# Patient Record
Sex: MALE | HISPANIC OR LATINO | Employment: UNEMPLOYED | URBAN - METROPOLITAN AREA
[De-identification: names, ages, dates, MRNs, and addresses within clinical notes are randomized per-mention and may not be internally consistent; named-entity substitution may affect disease eponyms.]

---

## 2022-08-31 ENCOUNTER — OFFICE VISIT (OUTPATIENT)
Dept: FAMILY MEDICINE CLINIC | Facility: CLINIC | Age: 27
End: 2022-08-31

## 2022-08-31 VITALS
WEIGHT: 270.6 LBS | HEART RATE: 88 BPM | SYSTOLIC BLOOD PRESSURE: 118 MMHG | TEMPERATURE: 98 F | DIASTOLIC BLOOD PRESSURE: 78 MMHG | HEIGHT: 73 IN | BODY MASS INDEX: 35.86 KG/M2 | OXYGEN SATURATION: 100 %

## 2022-08-31 DIAGNOSIS — F90.2 ATTENTION DEFICIT HYPERACTIVITY DISORDER (ADHD), COMBINED TYPE: ICD-10-CM

## 2022-08-31 DIAGNOSIS — K59.00 CONSTIPATION, UNSPECIFIED CONSTIPATION TYPE: ICD-10-CM

## 2022-08-31 DIAGNOSIS — G43.709 CHRONIC MIGRAINE WITHOUT AURA WITHOUT STATUS MIGRAINOSUS, NOT INTRACTABLE: ICD-10-CM

## 2022-08-31 DIAGNOSIS — Z00.00 PREVENTATIVE HEALTH CARE: Primary | ICD-10-CM

## 2022-08-31 PROCEDURE — 86803 HEPATITIS C AB TEST: CPT | Performed by: NURSE PRACTITIONER

## 2022-08-31 PROCEDURE — 36415 COLL VENOUS BLD VENIPUNCTURE: CPT | Performed by: NURSE PRACTITIONER

## 2022-08-31 PROCEDURE — 83036 HEMOGLOBIN GLYCOSYLATED A1C: CPT | Performed by: NURSE PRACTITIONER

## 2022-08-31 PROCEDURE — 82607 VITAMIN B-12: CPT | Performed by: NURSE PRACTITIONER

## 2022-08-31 PROCEDURE — 82306 VITAMIN D 25 HYDROXY: CPT | Performed by: NURSE PRACTITIONER

## 2022-08-31 PROCEDURE — 80050 GENERAL HEALTH PANEL: CPT | Performed by: NURSE PRACTITIONER

## 2022-08-31 PROCEDURE — 2014F MENTAL STATUS ASSESS: CPT | Performed by: NURSE PRACTITIONER

## 2022-08-31 PROCEDURE — 99385 PREV VISIT NEW AGE 18-39: CPT | Performed by: NURSE PRACTITIONER

## 2022-08-31 PROCEDURE — 80061 LIPID PANEL: CPT | Performed by: NURSE PRACTITIONER

## 2022-08-31 PROCEDURE — 3008F BODY MASS INDEX DOCD: CPT | Performed by: NURSE PRACTITIONER

## 2022-09-01 LAB
25(OH)D3 SERPL-MCNC: 22.9 NG/ML (ref 30–100)
ALBUMIN SERPL-MCNC: 5.1 G/DL (ref 3.5–5.2)
ALBUMIN/GLOB SERPL: 2.3 G/DL
ALP SERPL-CCNC: 51 U/L (ref 39–117)
ALT SERPL W P-5'-P-CCNC: 50 U/L (ref 1–41)
ANION GAP SERPL CALCULATED.3IONS-SCNC: 9 MMOL/L (ref 5–15)
AST SERPL-CCNC: 28 U/L (ref 1–40)
BILIRUB SERPL-MCNC: 0.9 MG/DL (ref 0–1.2)
BUN SERPL-MCNC: 17 MG/DL (ref 6–20)
BUN/CREAT SERPL: 18.3 (ref 7–25)
CALCIUM SPEC-SCNC: 10.1 MG/DL (ref 8.6–10.5)
CHLORIDE SERPL-SCNC: 101 MMOL/L (ref 98–107)
CHOLEST SERPL-MCNC: 208 MG/DL (ref 0–200)
CO2 SERPL-SCNC: 27 MMOL/L (ref 22–29)
CREAT SERPL-MCNC: 0.93 MG/DL (ref 0.76–1.27)
DEPRECATED RDW RBC AUTO: 41.6 FL (ref 37–54)
EGFRCR SERPLBLD CKD-EPI 2021: 115.4 ML/MIN/1.73
ERYTHROCYTE [DISTWIDTH] IN BLOOD BY AUTOMATED COUNT: 13.7 % (ref 12.3–15.4)
GLOBULIN UR ELPH-MCNC: 2.2 GM/DL
GLUCOSE SERPL-MCNC: 87 MG/DL (ref 65–99)
HBA1C MFR BLD: 5.3 % (ref 3.5–5.6)
HCT VFR BLD AUTO: 51 % (ref 37.5–51)
HCV AB SER DONR QL: NORMAL
HDLC SERPL-MCNC: 44 MG/DL (ref 40–60)
HGB BLD-MCNC: 16 G/DL (ref 13–17.7)
LDLC SERPL CALC-MCNC: 147 MG/DL (ref 0–100)
LDLC/HDLC SERPL: 3.3 {RATIO}
MCH RBC QN AUTO: 26.6 PG (ref 26.6–33)
MCHC RBC AUTO-ENTMCNC: 31.4 G/DL (ref 31.5–35.7)
MCV RBC AUTO: 84.7 FL (ref 79–97)
PLATELET # BLD AUTO: 282 10*3/MM3 (ref 140–450)
PMV BLD AUTO: 12 FL (ref 6–12)
POTASSIUM SERPL-SCNC: 4.4 MMOL/L (ref 3.5–5.2)
PROT SERPL-MCNC: 7.3 G/DL (ref 6–8.5)
RBC # BLD AUTO: 6.02 10*6/MM3 (ref 4.14–5.8)
SODIUM SERPL-SCNC: 137 MMOL/L (ref 136–145)
TRIGL SERPL-MCNC: 93 MG/DL (ref 0–150)
TSH SERPL DL<=0.05 MIU/L-ACNC: 1.45 UIU/ML (ref 0.27–4.2)
VIT B12 BLD-MCNC: 705 PG/ML (ref 211–946)
VLDLC SERPL-MCNC: 17 MG/DL (ref 5–40)
WBC NRBC COR # BLD: 9.78 10*3/MM3 (ref 3.4–10.8)

## 2022-09-02 DIAGNOSIS — E55.9 VITAMIN D DEFICIENCY: Primary | ICD-10-CM

## 2022-09-02 RX ORDER — ERGOCALCIFEROL 1.25 MG/1
CAPSULE ORAL
Qty: 15 CAPSULE | Refills: 1 | Status: SHIPPED | OUTPATIENT
Start: 2022-09-02 | End: 2022-09-09 | Stop reason: SDUPTHER

## 2022-09-08 DIAGNOSIS — F90.2 ATTENTION DEFICIT HYPERACTIVITY DISORDER (ADHD), COMBINED TYPE: Primary | ICD-10-CM

## 2022-09-09 ENCOUNTER — TELEPHONE (OUTPATIENT)
Dept: FAMILY MEDICINE CLINIC | Facility: CLINIC | Age: 27
End: 2022-09-09

## 2022-09-09 DIAGNOSIS — F90.2 ATTENTION DEFICIT HYPERACTIVITY DISORDER (ADHD), COMBINED TYPE: ICD-10-CM

## 2022-09-09 RX ORDER — ERGOCALCIFEROL 1.25 MG/1
CAPSULE ORAL
Qty: 15 CAPSULE | Refills: 1 | Status: SHIPPED | OUTPATIENT
Start: 2022-09-09 | End: 2023-03-20 | Stop reason: SDUPTHER

## 2022-09-09 NOTE — TELEPHONE ENCOUNTER
Patient called because it will cost him $500 if he does not get this from a pharmacy in network with his insurance. I have recent his vitamin D to the new pharmacy but I have the Vyvanse pending if you could sign for him. I have called and cancelled old Rx to [facility].

## 2022-09-09 NOTE — TELEPHONE ENCOUNTER
PATIENT IS CALLING TO CHECK THE STATUS OF SWITCHING THE FOLLOWING MEDICATIONS TO A DIFFERENT PHARMACY THAT PARTICIPATES WITH HIS INSURANCE.  THE PRESCRIPTIONS WERE CALLED IN TO [facility] BUT HE NEEDS THE MEDICATIONS TO GO TO THE FOLLOWING [facility].    [id number] - [facility] [place] - [address] - [phone]  - [phone] FX  [phone]      vitamin D (ERGOCALCIFEROL) 1.25 MG (21281 UT) capsule capsule                     lisdexamfetamine (Vyvanse) 50 MG capsule     PATIENT IS REQUESTING A RETURN CALL TO ADVISE WHEN THE MEDICATIONS HAVE BEEN SENT TO [facility].    PATIENT CALL BACK [phone]

## 2022-09-09 NOTE — TELEPHONE ENCOUNTER
Caller: Jack Pradhan    Relationship: Self    Best call back number: [phone]    What is the best time to reach you: ANY TIME    Who are you requesting to speak with (clinical staff, provider,  specific staff member): CLINICAL STAFF    Do you know the name of the person who called:     What was the call regarding:  PATIENT REQUESTS HIS VYVANSE AND B12 BOTH BE SENT TO [facility] IN [place]. [facility] IS INSURANCE'S PREFERRED PHARMACY.     PLEASE CALL PATIENT WHEN SENT.     Do you require a callback: YES

## 2022-10-10 DIAGNOSIS — F90.2 ATTENTION DEFICIT HYPERACTIVITY DISORDER (ADHD), COMBINED TYPE: ICD-10-CM

## 2022-10-10 NOTE — TELEPHONE ENCOUNTER
Caller: Jack Pradhan    Relationship: Self    Best call back number:     Requested Prescriptions:   Requested Prescriptions     Pending Prescriptions Disp Refills   • lisdexamfetamine (Vyvanse) 50 MG capsule 30 capsule 0     Sig: Take 1 capsule by mouth Every Morning        Pharmacy where request should be sent: [facility]  [address] [place] [address] [phone]     Additional details provided by patient:     Does the patient have less than a 3 day supply:  [x] Yes  [] No    Lauren Cabrera Rep   10/10/22 14:19 EDT

## 2022-11-16 ENCOUNTER — TELEPHONE (OUTPATIENT)
Dept: FAMILY MEDICINE CLINIC | Facility: CLINIC | Age: 27
End: 2022-11-16

## 2022-11-16 DIAGNOSIS — F90.2 ATTENTION DEFICIT HYPERACTIVITY DISORDER (ADHD), COMBINED TYPE: ICD-10-CM

## 2022-11-16 NOTE — TELEPHONE ENCOUNTER
Caller: Jack Pradhan    Relationship: Self    Best call back number: [phone]    Requested Prescriptions:   Requested Prescriptions     Pending Prescriptions Disp Refills   • lisdexamfetamine (Vyvanse) 50 MG capsule 30 capsule 0     Sig: Take 1 capsule by mouth Every Morning        Pharmacy where request should be sent: [id number] - [phone]  - [phone] FX     Additional details provided by patient:     Does the patient have less than a 3 day supply:  [x] Yes  [] No    Lauren Pal Rep   11/16/22 13:36 EST

## 2022-12-19 ENCOUNTER — OFFICE VISIT (OUTPATIENT)
Dept: FAMILY MEDICINE CLINIC | Facility: CLINIC | Age: 27
End: 2022-12-19

## 2022-12-19 VITALS
DIASTOLIC BLOOD PRESSURE: 78 MMHG | WEIGHT: 259.8 LBS | SYSTOLIC BLOOD PRESSURE: 110 MMHG | HEIGHT: 73 IN | HEART RATE: 93 BPM | BODY MASS INDEX: 34.43 KG/M2 | OXYGEN SATURATION: 97 %

## 2022-12-19 DIAGNOSIS — F90.2 ATTENTION DEFICIT HYPERACTIVITY DISORDER (ADHD), COMBINED TYPE: Primary | ICD-10-CM

## 2022-12-19 DIAGNOSIS — H65.01 NON-RECURRENT ACUTE SEROUS OTITIS MEDIA OF RIGHT EAR: ICD-10-CM

## 2022-12-19 DIAGNOSIS — G43.709 CHRONIC MIGRAINE WITHOUT AURA WITHOUT STATUS MIGRAINOSUS, NOT INTRACTABLE: ICD-10-CM

## 2022-12-19 DIAGNOSIS — E55.9 VITAMIN D DEFICIENCY: ICD-10-CM

## 2022-12-19 PROCEDURE — 99214 OFFICE O/P EST MOD 30 MIN: CPT | Performed by: NURSE PRACTITIONER

## 2022-12-19 RX ORDER — AZITHROMYCIN 250 MG/1
TABLET, FILM COATED ORAL
Qty: 6 TABLET | Refills: 0 | Status: SHIPPED | OUTPATIENT
Start: 2022-12-19 | End: 2023-03-20

## 2022-12-19 RX ORDER — CHLORCYCLIZINE HYDROCHLORIDE AND PSEUDOEPHEDRINE HYDROCHLORIDE 25; 60 MG/1; MG/1
1 TABLET ORAL EVERY 8 HOURS PRN
Qty: 40 TABLET | Refills: 0 | Status: SHIPPED | OUTPATIENT
Start: 2022-12-19 | End: 2023-03-20

## 2023-01-17 ENCOUNTER — TELEPHONE (OUTPATIENT)
Dept: FAMILY MEDICINE CLINIC | Facility: CLINIC | Age: 28
End: 2023-01-17

## 2023-01-17 DIAGNOSIS — F90.2 ATTENTION DEFICIT HYPERACTIVITY DISORDER (ADHD), COMBINED TYPE: ICD-10-CM

## 2023-01-17 NOTE — TELEPHONE ENCOUNTER
Caller: Jack Pradhan    Relationship: Self    Best call back number: [phone]    What medication are you requesting: lisdexamfetamine (Vyvanse)          50 MG     What are your current symptoms:     How long have you been experiencing symptoms:     Have you had these symptoms before:      [] Yes  [] No    Have you been treated for these symptoms before:   [] Yes  [] No    If a prescription is needed, what is your preferred pharmacy and phone number: [id number] - [address] - [phone] [facility] [phone]      Additional notes: PATIENT STATES 60 MG DOES WORK WELL FOR HIM. ASKS THAT [facility] PRESCRIBE 50 MG

## 2023-01-18 RX ORDER — LISDEXAMFETAMINE DIMESYLATE CAPSULES 50 MG/1
50 CAPSULE ORAL EVERY MORNING
Qty: 30 CAPSULE | Refills: 0 | Status: SHIPPED | OUTPATIENT
Start: 2023-01-18 | End: 2023-02-22 | Stop reason: SDUPTHER

## 2023-01-18 NOTE — TELEPHONE ENCOUNTER
Spoke to pt and he is requesting the 50mg. Pt said the 60mg made him feel like he wasn't able to focus, he was feeling more sad than usual and his emotions were irrational. Pt also says the 50mg worked well in the beginning and then after a while he feels like it stopped working as well. Pt says he still prefers the 50mg over the 60mg

## 2023-02-22 DIAGNOSIS — F90.2 ATTENTION DEFICIT HYPERACTIVITY DISORDER (ADHD), COMBINED TYPE: ICD-10-CM

## 2023-02-22 NOTE — TELEPHONE ENCOUNTER
Caller: Jack Pradhan    Relationship: Self    Best call back number: [phone]    Requested Prescriptions:   Requested Prescriptions     Pending Prescriptions Disp Refills   • lisdexamfetamine (Vyvanse) 50 MG capsule 30 capsule 0     Sig: Take 1 capsule by mouth Every Morning        Pharmacy where request should be sent: [id number] - [phone]  - [phone] FX     Additional details provided by patient: PATIENT IS COMPLETELY OUT     Does the patient have less than a 3 day supply:  [x] Yes  [] No    Would you like a call back once the refill request has been completed: [x] Yes [] No    If the office needs to give you a call back, can they leave a voicemail: [x] Yes [] No    Lauren Pinon Rep   02/22/23 12:59 EST

## 2023-03-20 ENCOUNTER — OFFICE VISIT (OUTPATIENT)
Dept: FAMILY MEDICINE CLINIC | Facility: CLINIC | Age: 28
End: 2023-03-20
Payer: MEDICAID

## 2023-03-20 VITALS
WEIGHT: 252.2 LBS | HEIGHT: 73 IN | HEART RATE: 87 BPM | BODY MASS INDEX: 33.43 KG/M2 | SYSTOLIC BLOOD PRESSURE: 128 MMHG | DIASTOLIC BLOOD PRESSURE: 82 MMHG | OXYGEN SATURATION: 98 %

## 2023-03-20 DIAGNOSIS — F90.2 ATTENTION DEFICIT HYPERACTIVITY DISORDER (ADHD), COMBINED TYPE: ICD-10-CM

## 2023-03-20 DIAGNOSIS — E55.9 VITAMIN D DEFICIENCY: ICD-10-CM

## 2023-03-20 PROCEDURE — 1159F MED LIST DOCD IN RCRD: CPT | Performed by: NURSE PRACTITIONER

## 2023-03-20 PROCEDURE — 99213 OFFICE O/P EST LOW 20 MIN: CPT | Performed by: NURSE PRACTITIONER

## 2023-03-20 PROCEDURE — 1160F RVW MEDS BY RX/DR IN RCRD: CPT | Performed by: NURSE PRACTITIONER

## 2023-03-20 RX ORDER — ERGOCALCIFEROL 1.25 MG/1
CAPSULE ORAL
Qty: 15 CAPSULE | Refills: 1 | Status: SHIPPED | OUTPATIENT
Start: 2023-03-20
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
## 2023-03-21 DIAGNOSIS — F90.2 ATTENTION DEFICIT HYPERACTIVITY DISORDER (ADHD), COMBINED TYPE: ICD-10-CM
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
## 2023-04-06 ENCOUNTER — OFFICE VISIT (OUTPATIENT)
Dept: FAMILY MEDICINE CLINIC | Facility: CLINIC | Age: 28
End: 2023-04-06
Payer: MEDICAID

## 2023-04-06 VITALS
WEIGHT: 246.6 LBS | OXYGEN SATURATION: 98 % | HEIGHT: 73 IN | SYSTOLIC BLOOD PRESSURE: 126 MMHG | HEART RATE: 95 BPM | BODY MASS INDEX: 32.68 KG/M2 | DIASTOLIC BLOOD PRESSURE: 82 MMHG

## 2023-04-06 DIAGNOSIS — Z20.9 SEXUAL BEHAVIOR WITH HIGH RISK OF EXPOSURE TO COMMUNICABLE DISEASE: ICD-10-CM

## 2023-04-06 DIAGNOSIS — Z72.51 SEXUAL BEHAVIOR WITH HIGH RISK OF EXPOSURE TO COMMUNICABLE DISEASE: ICD-10-CM

## 2023-04-06 DIAGNOSIS — M25.561 CHRONIC PAIN OF BOTH KNEES: ICD-10-CM

## 2023-04-06 DIAGNOSIS — F90.2 ATTENTION DEFICIT HYPERACTIVITY DISORDER (ADHD), COMBINED TYPE: Primary | ICD-10-CM

## 2023-04-06 DIAGNOSIS — G89.29 CHRONIC PAIN OF BOTH KNEES: ICD-10-CM

## 2023-04-06 DIAGNOSIS — M25.562 CHRONIC PAIN OF BOTH KNEES: ICD-10-CM

## 2023-04-06 LAB
C TRACH RRNA CVX QL NAA+PROBE: NOT DETECTED
N GONORRHOEA RRNA SPEC QL NAA+PROBE: NOT DETECTED

## 2023-04-06 PROCEDURE — 87661 TRICHOMONAS VAGINALIS AMPLIF: CPT | Performed by: NURSE PRACTITIONER

## 2023-04-06 PROCEDURE — 86696 HERPES SIMPLEX TYPE 2 TEST: CPT | Performed by: NURSE PRACTITIONER

## 2023-04-06 PROCEDURE — 86592 SYPHILIS TEST NON-TREP QUAL: CPT | Performed by: NURSE PRACTITIONER

## 2023-04-06 PROCEDURE — 87591 N.GONORRHOEAE DNA AMP PROB: CPT | Performed by: NURSE PRACTITIONER

## 2023-04-06 PROCEDURE — 87491 CHLMYD TRACH DNA AMP PROBE: CPT | Performed by: NURSE PRACTITIONER

## 2023-04-06 PROCEDURE — 86695 HERPES SIMPLEX TYPE 1 TEST: CPT | Performed by: NURSE PRACTITIONER

## 2023-04-06 PROCEDURE — G0432 EIA HIV-1/HIV-2 SCREEN: HCPCS | Performed by: NURSE PRACTITIONER

## 2023-04-06 RX ORDER — ACETAMINOPHEN 500 MG
500 TABLET ORAL EVERY 6 HOURS PRN
COMMUNITY

## 2023-04-06 RX ORDER — MELOXICAM 15 MG/1
15 TABLET ORAL DAILY
Qty: 90 TABLET | Refills: 0 | Status: SHIPPED | OUTPATIENT
Start: 2023-04-06

## 2023-04-06 NOTE — PROGRESS NOTES
Venipuncture Blood Specimen Collection  Venipuncture performed in right arm by Heather Abbasi MA with good hemostasis. Patient tolerated the procedure well without complications.   04/06/23   Heather Abbasi MA
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
## 2023-04-07 LAB
HIV1+2 AB SER QL: NORMAL
RPR SER QL: NORMAL

## 2023-04-08 LAB
HSV1 IGG SER IA-ACNC: <0.91 INDEX (ref 0–0.9)
HSV2 IGG SER IA-ACNC: <0.91 INDEX (ref 0–0.9)

## 2023-04-10 LAB — T VAGINALIS RRNA SPEC QL NAA+PROBE: NEGATIVE

## 2023-04-12 ENCOUNTER — TELEPHONE (OUTPATIENT)
Dept: FAMILY MEDICINE CLINIC | Facility: CLINIC | Age: 28
End: 2023-04-12
Payer: MEDICAID

## 2023-04-12 NOTE — TELEPHONE ENCOUNTER
Spoke with pt regarding xray, pt states still planning on completing. Pt needed address, sent [place] info via [employer]

## 2023-04-25 DIAGNOSIS — F90.2 ATTENTION DEFICIT HYPERACTIVITY DISORDER (ADHD), COMBINED TYPE: ICD-10-CM

## 2023-04-28 ENCOUNTER — TELEPHONE (OUTPATIENT)
Dept: FAMILY MEDICINE CLINIC | Facility: CLINIC | Age: 28
End: 2023-04-28

## 2023-04-28 NOTE — TELEPHONE ENCOUNTER
Spoke with Jack. He stated that he did review message in [employer]. He would like to proceed with MRI at this time.

## 2023-04-28 NOTE — TELEPHONE ENCOUNTER
Caller: Jack Pradhan    Relationship: Self    Best call back number: [phone]       PATIENT HAD A X-RAY ON HIS KNEES RECENTLY AND WOULD LIKE TO DISCUSS WHAT NEXT STEPS ARE.    PLEASE ADVISE

## 2023-05-01 DIAGNOSIS — M25.562 CHRONIC PAIN OF LEFT KNEE: Primary | ICD-10-CM

## 2023-05-01 DIAGNOSIS — G89.29 CHRONIC PAIN OF LEFT KNEE: Primary | ICD-10-CM

## 2023-05-08 ENCOUNTER — TELEPHONE (OUTPATIENT)
Dept: FAMILY MEDICINE CLINIC | Facility: CLINIC | Age: 28
End: 2023-05-08
Payer: MEDICAID

## 2023-05-08 NOTE — TELEPHONE ENCOUNTER
PA for MRI knee approved. Order and auth faxed to [facility]. Left detailed voice message for Jack informing him to call [place] at anytime to schedule or call back to office if prefer we schedule.    If he wants us to schedule, need any day/time preferences    Ok for HUB to read/reply.

## 2023-05-11 ENCOUNTER — TELEPHONE (OUTPATIENT)
Dept: FAMILY MEDICINE CLINIC | Facility: CLINIC | Age: 28
End: 2023-05-11
Payer: MEDICAID

## 2023-05-11 NOTE — TELEPHONE ENCOUNTER
Attempted to call pt regarding MRI, no answer: per verbal left msg with [place] number and to call office if scheduled or completed

## 2023-06-02 DIAGNOSIS — F90.2 ATTENTION DEFICIT HYPERACTIVITY DISORDER (ADHD), COMBINED TYPE: ICD-10-CM

## 2023-06-12 ENCOUNTER — OFFICE VISIT (OUTPATIENT)
Dept: FAMILY MEDICINE CLINIC | Facility: CLINIC | Age: 28
End: 2023-06-12
Payer: MEDICAID

## 2023-06-12 VITALS
HEART RATE: 92 BPM | BODY MASS INDEX: 32.89 KG/M2 | SYSTOLIC BLOOD PRESSURE: 128 MMHG | OXYGEN SATURATION: 98 % | HEIGHT: 73 IN | WEIGHT: 248.2 LBS | DIASTOLIC BLOOD PRESSURE: 88 MMHG

## 2023-06-12 DIAGNOSIS — M25.361 KNEE INSTABILITY, RIGHT: ICD-10-CM

## 2023-06-12 DIAGNOSIS — G89.29 CHRONIC MID BACK PAIN: Primary | ICD-10-CM

## 2023-06-12 DIAGNOSIS — M25.521 RIGHT ELBOW PAIN: ICD-10-CM

## 2023-06-12 DIAGNOSIS — M25.531 BILATERAL WRIST PAIN: ICD-10-CM

## 2023-06-12 DIAGNOSIS — M25.532 BILATERAL WRIST PAIN: ICD-10-CM

## 2023-06-12 DIAGNOSIS — F90.2 ATTENTION DEFICIT HYPERACTIVITY DISORDER (ADHD), COMBINED TYPE: ICD-10-CM

## 2023-06-12 DIAGNOSIS — M54.9 CHRONIC MID BACK PAIN: Primary | ICD-10-CM

## 2023-06-12 DIAGNOSIS — G89.29 CHRONIC PAIN OF BOTH KNEES: ICD-10-CM

## 2023-06-12 DIAGNOSIS — E55.9 VITAMIN D DEFICIENCY: ICD-10-CM

## 2023-06-12 DIAGNOSIS — M25.562 CHRONIC PAIN OF BOTH KNEES: ICD-10-CM

## 2023-06-12 DIAGNOSIS — M25.561 CHRONIC PAIN OF BOTH KNEES: ICD-10-CM

## 2023-06-12 PROCEDURE — 1160F RVW MEDS BY RX/DR IN RCRD: CPT | Performed by: NURSE PRACTITIONER

## 2023-06-12 PROCEDURE — 99214 OFFICE O/P EST MOD 30 MIN: CPT | Performed by: NURSE PRACTITIONER

## 2023-06-12 PROCEDURE — 1159F MED LIST DOCD IN RCRD: CPT | Performed by: NURSE PRACTITIONER

## 2023-06-12 RX ORDER — INDOMETHACIN 75 MG/1
75 CAPSULE, EXTENDED RELEASE ORAL 2 TIMES DAILY PRN
Qty: 60 CAPSULE | Refills: 0 | Status: SHIPPED | OUTPATIENT
Start: 2023-06-12

## 2023-06-12 NOTE — PROGRESS NOTES
"Chief Complaint  Chief Complaint   Patient presents with   • Follow-up     Follow up for ADD   • Joint Pain     Pt having joint pain in right elbow and both wrists. Pt says the pain is constant. Pt says pain in elbow started a few months ago and pain in wrist has been going on for several years.    • Medication Problem     Pt says current dose of Vyvanse is helping but pt would like to increase dose again   • Back Pain     Pt having pain in lower back, pain started a few weeks ago. Pt says the pain is at a \"constant 2\" on a scale of 1-10. 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 The patient is working, able to complete tasks and has good focus, denies chest pain, headache, shortness of air, palpitations and swelling of extremities, needs medication refill today.     Right elbow and bilateral wrist pain, constant, he drives for work, uses right hand mostly. He was in the  had multiple blunt force trauma injuries.  He was started on meloxicam for bilateral knee pain-reports minimal improvement, x-ray of both knees was essentially normal, patient wished to proceed with MRI, it was ordered on 5/1/2023 and scheduled for today.  He now reports the right knee is bothering him more than the left, it is unstable at times, he wears a brace for work.  He reports crepitus with any bending or squatting    Mid low back pain as mentioned in chief complaint, ache, after sitting too long, he drives for work. He fell off a 10 foot ledge at age 7, denies injury to the back at that time. He exercise regularly, stopped doing core exercises lately due to certain motions increasing pain      The " "following portions of the patient's history were reviewed and updated as appropriate: allergies, current medications, past family history, past medical history, past social history, past surgical history and problem list.    Past Medical History:   Diagnosis Date   • ADHD (attention deficit hyperactivity disorder)    • Eczema    • Headache      History reviewed. No pertinent surgical history.  History reviewed. No pertinent family history.  Social History     Tobacco Use   • Smoking status: Never   • Smokeless tobacco: Never   Substance Use Topics   • Alcohol use: Yes     Comment: OCC       Current Outpatient Medications:   •  acetaminophen (TYLENOL) 500 MG tablet, Take 1 tablet by mouth Every 6 (Six) Hours As Needed for Mild Pain., Disp: , Rfl:   •  meloxicam (Mobic) 15 MG tablet, Take 1 tablet by mouth Daily., Disp: 90 tablet, Rfl: 0  •  vitamin D (ERGOCALCIFEROL) 1.25 MG (62485 UT) capsule capsule, Take 1 po weekly on mondays, Disp: 15 capsule, Rfl: 1  •  indomethacin SR (INDOCIN SR) 75 MG CR capsule, Take 1 capsule by mouth 2 (Two) Times a Day As Needed (joint/back pain)., Disp: 60 capsule, Rfl: 0  •  lisdexamfetamine (Vyvanse) 70 MG capsule, Take 1 capsule by mouth Every Morning, Disp: 30 capsule, Rfl: 0    Objective   Vital Signs:   /88 (BP Location: Right arm, Patient Position: Sitting, Cuff Size: Adult)   Pulse 92   Ht 185.4 cm (72.99\")   Wt 113 kg (248 lb 3.2 oz)   SpO2 98%   BMI 32.75 kg/m²           Physical Exam  Constitutional:       General: He is not in acute distress.     Appearance: Normal appearance. He is well-developed. He is not ill-appearing or diaphoretic.   HENT:      Head: Normocephalic.   Eyes:      Conjunctiva/sclera: Conjunctivae normal.      Pupils: Pupils are equal, round, and reactive to light.   Neck:      Thyroid: No thyromegaly.      Vascular: No JVD.   Cardiovascular:      Rate and Rhythm: Normal rate and regular rhythm.      Heart sounds: Normal heart sounds. No murmur " heard.  Pulmonary:      Effort: Pulmonary effort is normal. No respiratory distress.      Breath sounds: Normal breath sounds. No wheezing or rhonchi.   Abdominal:      General: Bowel sounds are normal. There is no distension.      Palpations: Abdomen is soft.      Tenderness: There is no abdominal tenderness.   Musculoskeletal:         General: Tenderness (mid/low back, R elbow, B wrists, R>L knee ttp, redness present of both knees, good rom of all joints) present. No swelling. Normal range of motion.      Cervical back: Normal range of motion and neck supple. No tenderness.   Lymphadenopathy:      Cervical: No cervical adenopathy.   Skin:     General: Skin is warm and dry.      Coloration: Skin is not jaundiced.      Findings: No erythema or rash.   Neurological:      General: No focal deficit present.      Mental Status: He is alert and oriented to person, place, and time. Mental status is at baseline.      Sensory: No sensory deficit.   Psychiatric:         Mood and Affect: Mood normal.         Behavior: Behavior normal.         Thought Content: Thought content normal.         Judgment: Judgment normal.          Result Review :     No visits with results within 7 Day(s) from this visit.   Latest known visit with results is:   Office Visit on 04/06/2023   Component Date Value Ref Range Status   • HIV-1/ HIV-2 04/06/2023 Non-Reactive  Non-Reactive Final    A non-reactive test result does not preclude the possibility of exposure to HIV or infection with HIV. An antibody response to recent exposure may take several months to reach detectable levels.   • HSV 1 IgG, Type Specific 04/06/2023 <0.91  0.00 - 0.90 index Final                                     Negative        <0.91                                   Equivocal 0.91 - 1.09                                   Positive        >1.09   Note: Negative indicates no antibodies detected to   HSV-1. Equivocal may suggest early infection.  If   clinically appropriate,  retest at later date. Positive   indicates antibodies detected to HSV-1.   • HSV 2 IgG 04/06/2023 <0.91  0.00 - 0.90 index Final                                     Negative        <0.91                                   Equivocal 0.91 - 1.09                                   Positive        >1.09   Note: Negative indicates no HSV-2 antibodies detected.   Positive indicates HSV-2 antibodies detected.   Equivocal and low positive HSV-2 screens   (Index 0.91-5.00) may be false positive and are   reflexed to supplemental testing in accordance with   CDC guidelines.   • RPR 04/06/2023 Non-Reactive  Non-Reactive Final   • Trichomonas vaginosis 04/06/2023 Negative  Negative Final   • Chlamydia DNA by PCR 04/06/2023 Not Detected  Not Detected, Invalid Final   • Neisseria gonorrhoeae by PCR 04/06/2023 Not Detected  Not Detected Final                              Assessment and Plan    Diagnoses and all orders for this visit:    1. Chronic mid back pain (Primary)  Comments:  Recommend trial of indomethacin  DC meloxicam if indomethacin effective  rec core strengthening    2. Attention deficit hyperactivity disorder (ADHD), combined type  Comments:  will try increase of vyvanse to 70mg  Orders:  -     lisdexamfetamine (Vyvanse) 70 MG capsule; Take 1 capsule by mouth Every Morning  Dispense: 30 capsule; Refill: 0    3. Chronic pain of both knees  Comments:  R knee now worse, left knee still painful.   MRI left knee later today.   ordered R knee MRI  Orders:  -     MRI Knee Right Without Contrast; Future    4. Right elbow pain  Comments:  Recommend limiting of repetitive motions  pain of multi joints and low back likely secondary to trauma and overuse while in   rec trial of indomethacin    5. Bilateral wrist pain  Comments:  Likely due to repetitive motions while driving/working, try to limit use  try indomethacin    6. Vitamin D deficiency  Comments:  Continue weekly vitamin D    7. Knee instability,  right  Comments:  xray essentially normal  R knee MRI ordered  Continue to wear brace for work  Consider referral to Ortho  Orders:  -     MRI Knee Right Without Contrast; Future    Other orders  -     indomethacin SR (INDOCIN SR) 75 MG CR capsule; Take 1 capsule by mouth 2 (Two) Times a Day As Needed (joint/back pain).  Dispense: 60 capsule; Refill: 0      Will check uric acid with labs in august at annual exam    I spent 30 minutes caring for Jack Pradhan on this date of service. This time includes time spent by me in the following activities: preparing for the visit, reviewing tests, performing a medically appropriate examination and/or evaluation , counseling and educating the patient/family/caregiver, ordering medications, tests, or procedures and documenting information in the medical record        Follow Up     Return in about 3 months (around 9/12/2023) for annual exam, recheck ADD, multi joint pain. HTN panel and uric acid prior to appt.  Patient was given instructions and counseling regarding his condition or for health maintenance advice. Please see specific information pulled into the AVS if appropriate.        Part of this note may be an electronic transcription/translation of spoken language to printed text using the Dragon Dictation System

## 2023-06-14 DIAGNOSIS — M25.562 CHRONIC PAIN OF LEFT KNEE: ICD-10-CM

## 2023-06-14 DIAGNOSIS — M25.361 KNEE INSTABILITY, RIGHT: ICD-10-CM

## 2023-06-14 DIAGNOSIS — G89.29 CHRONIC PAIN OF LEFT KNEE: ICD-10-CM

## 2023-06-14 DIAGNOSIS — M71.22 BAKER'S CYST OF KNEE, LEFT: Primary | ICD-10-CM

## 2023-08-18 DIAGNOSIS — F90.2 ATTENTION DEFICIT HYPERACTIVITY DISORDER (ADHD), COMBINED TYPE: ICD-10-CM

## 2023-08-18 NOTE — TELEPHONE ENCOUNTER
Caller: Jack Pradhan    Relationship: Self    Best call back number: [phone]   Requested Prescriptions:   Requested Prescriptions     Pending Prescriptions Disp Refills    lisdexamfetamine (Vyvanse) 70 MG capsule 30 capsule 0     Sig: Take 1 capsule by mouth Every Morning        Pharmacy where request should be sent: [facility] [id number] [place] [id number] - [phone]  - [phone] FX     Last office visit with prescribing clinician: 6/12/2023   Last telemedicine visit with prescribing clinician: Visit date not found   Next office visit with prescribing clinician: 9/12/2023     Additional details provided by patient: PATIENT HAS 1 TABLET LEFT OF MEDICATION     Does the patient have less than a 3 day supply:  [x] Yes  [] No    Would you like a call back once the refill request has been completed: [] Yes [x] No    If the office needs to give you a call back, can they leave a voicemail: [] Yes [x] No    Lauren Gutierrez Rep   08/18/23 10:34 EDT

## 2023-08-29 NOTE — PROGRESS NOTES
Chief Complaint  Chief Complaint   Patient presents with   • Follow-up     Follow up for vyvanse    • Ear Fullness     Pt was on a plane 2 days ago and still feels like his ears have not popped due to the change in pressure. Pt says the sound is very muffled in both ears            Subjective          Jack Pradhan presents to [facility] for   History of Present Illness     ADD, pt presents today for medication f/u, Vyvanse was resumed in August, he is working, frequently traveling for his job.  He reports improvement with medication and concentration/focus and migraines, however he feeling the medication is wearing off and not quite as active as he would like.  He seems to be forgetful at times not near as often w/o medication. He denies chest pain, shortness of air, palpitations and swelling of extremities, he is sleeping well, has good appetite, would like to discuss increasing medications today.      Migraines are controlled better now back on vyvanse, having 1-2/week and tolerable now on OTC medications.     Ear fullness, muffled hearing R>L as mentioned in chief complaint since having multiple flights      The following portions of the patient's history were reviewed and updated as appropriate: allergies, current medications, past family history, past medical history, past social history, past surgical history and problem list.    Past Medical History:   Diagnosis Date   • ADHD (attention deficit hyperactivity disorder)    • Eczema    • Headache      History reviewed. No pertinent surgical history.  History reviewed. No pertinent family history.  Social History     Tobacco Use   • Smoking status: Never   • Smokeless tobacco: Never   Substance Use Topics   • Alcohol use: Yes     Comment: OCC       Current Outpatient Medications:   •  vitamin D (ERGOCALCIFEROL) 1.25 MG (08518 UT) capsule capsule, Take 1 po daily for 3 days then once weekly on mondays, Disp: 15 capsule, Rfl: 1  •   "azithromycin (Zithromax) 250 MG tablet, Take 2 tablets the first day, then 1 tablet daily for 4 days., Disp: 6 tablet, Rfl: 0  •  Chlorcyclizine-Pseudoephed (Stahist AD) 25-60 MG tablet, Take 1 tablet by mouth Every 8 (Eight) Hours As Needed (sinus pain/congestion)., Disp: 40 tablet, Rfl: 0  •  lisdexamfetamine (Vyvanse) 60 MG capsule, Take 1 capsule by mouth Every Morning, Disp: 30 capsule, Rfl: 0    Objective   Vital Signs:   /78 (BP Location: Left arm, Patient Position: Sitting, Cuff Size: Adult)   Pulse 93   Ht 184.2 cm (72.52\")   Wt 118 kg (259 lb 12.8 oz)   SpO2 97%   BMI 34.73 kg/m²           Physical Exam  Vitals and nursing note reviewed.   Constitutional:       General: He is not in acute distress.     Appearance: Normal appearance. He is well-developed. He is not diaphoretic.   HENT:      Head: Normocephalic and atraumatic.      Left Ear: Ear canal normal.      Ears:      Comments: Right canal erythemic, TM erythemic, bulging with serous effusion. L TM with scant serous effusion  Eyes:      Conjunctiva/sclera: Conjunctivae normal.      Pupils: Pupils are equal, round, and reactive to light.   Neck:      Thyroid: No thyromegaly.   Cardiovascular:      Rate and Rhythm: Normal rate and regular rhythm.      Heart sounds: Normal heart sounds. No murmur heard.  Pulmonary:      Effort: Pulmonary effort is normal. No respiratory distress.      Breath sounds: Normal breath sounds. No wheezing or rhonchi.   Abdominal:      General: Bowel sounds are normal. There is no distension.      Palpations: Abdomen is soft.      Tenderness: There is no abdominal tenderness.   Musculoskeletal:         General: No swelling or tenderness. Normal range of motion.      Cervical back: Normal range of motion. No rigidity.   Skin:     General: Skin is warm and dry.      Findings: No erythema.   Neurological:      General: No focal deficit present.      Mental Status: He is alert and oriented to person, place, and time. " Mental status is at baseline.   Psychiatric:         Mood and Affect: Mood normal.         Behavior: Behavior normal.         Thought Content: Thought content normal.         Judgment: Judgment normal.          Result Review :     No visits with results within 7 Day(s) from this visit.   Latest known visit with results is:   Office Visit on 08/31/2022   Component Date Value Ref Range Status   • Hepatitis C Ab 08/31/2022 Non-Reactive  Non-Reactive Final   • Total Cholesterol 08/31/2022 208 (H)  0 - 200 mg/dL Final   • Triglycerides 08/31/2022 93  0 - 150 mg/dL Final   • HDL Cholesterol 08/31/2022 44  40 - 60 mg/dL Final   • LDL Cholesterol  08/31/2022 147 (H)  0 - 100 mg/dL Final   • VLDL Cholesterol 08/31/2022 17  5 - 40 mg/dL Final   • LDL/HDL Ratio 08/31/2022 3.30   Final   • Glucose 08/31/2022 87  65 - 99 mg/dL Final   • BUN 08/31/2022 17  6 - 20 mg/dL Final   • Creatinine 08/31/2022 0.93  0.76 - 1.27 mg/dL Final   • Sodium 08/31/2022 137  136 - 145 mmol/L Final   • Potassium 08/31/2022 4.4  3.5 - 5.2 mmol/L Final   • Chloride 08/31/2022 101  98 - 107 mmol/L Final   • CO2 08/31/2022 27.0  22.0 - 29.0 mmol/L Final   • Calcium 08/31/2022 10.1  8.6 - 10.5 mg/dL Final   • Total Protein 08/31/2022 7.3  6.0 - 8.5 g/dL Final   • Albumin 08/31/2022 5.10  3.50 - 5.20 g/dL Final   • ALT (SGPT) 08/31/2022 50 (H)  1 - 41 U/L Final   • AST (SGOT) 08/31/2022 28  1 - 40 U/L Final   • Alkaline Phosphatase 08/31/2022 51  39 - 117 U/L Final   • Total Bilirubin 08/31/2022 0.9  0.0 - 1.2 mg/dL Final   • Globulin 08/31/2022 2.2  gm/dL Final   • A/G Ratio 08/31/2022 2.3  g/dL Final   • BUN/Creatinine Ratio 08/31/2022 18.3  7.0 - 25.0 Final   • Anion Gap 08/31/2022 9.0  5.0 - 15.0 mmol/L Final   • eGFR 08/31/2022 115.4  >60.0 mL/min/1.73 Final    National Kidney Foundation and American Society of Nephrology (ASN) Task Force recommended calculation based on the Chronic Kidney Disease Epidemiology Collaboration (CKD-EPI) equation refit  without adjustment for race.   • WBC 08/31/2022 9.78  3.40 - 10.80 10*3/mm3 Final   • RBC 08/31/2022 6.02 (H)  4.14 - 5.80 10*6/mm3 Final   • Hemoglobin 08/31/2022 16.0  13.0 - 17.7 g/dL Final   • Hematocrit 08/31/2022 51.0  37.5 - 51.0 % Final   • MCV 08/31/2022 84.7  79.0 - 97.0 fL Final   • MCH 08/31/2022 26.6  26.6 - 33.0 pg Final   • MCHC 08/31/2022 31.4 (L)  31.5 - 35.7 g/dL Final   • RDW 08/31/2022 13.7  12.3 - 15.4 % Final   • RDW-SD 08/31/2022 41.6  37.0 - 54.0 fl Final   • MPV 08/31/2022 12.0  6.0 - 12.0 fL Final   • Platelets 08/31/2022 282  140 - 450 10*3/mm3 Final   • TSH 08/31/2022 1.450  0.270 - 4.200 uIU/mL Final   • Hemoglobin A1C 08/31/2022 5.3  3.5 - 5.6 % Final   • 25 Hydroxy, Vitamin D 08/31/2022 22.9 (L)  30.0 - 100.0 ng/ml Final   • Vitamin B-12 08/31/2022 705  211 - 946 pg/mL Final                  BMI is >= 30 and <35. (Class 1 Obesity). The following options were offered after discussion;: exercise counseling/recommendations and nutrition counseling/recommendations           Assessment and Plan    Diagnoses and all orders for this visit:    1. Attention deficit hyperactivity disorder (ADHD), combined type (Primary)  Comments:  will increase vyvanse to 60mg, will recheck in 3mo  Orders:  -     lisdexamfetamine (Vyvanse) 60 MG capsule; Take 1 capsule by mouth Every Morning  Dispense: 30 capsule; Refill: 0    2. Chronic migraine without aura without status migrainosus, not intractable  Comments:  improved now on vyvanse, rec continue OTC medications as needed    3. Vitamin D deficiency  Comments:  continue weekly vitamin D    4. Non-recurrent acute serous otitis media of right ear  Comments:  start zpack and stahist    Other orders  -     azithromycin (Zithromax) 250 MG tablet; Take 2 tablets the first day, then 1 tablet daily for 4 days.  Dispense: 6 tablet; Refill: 0  -     Chlorcyclizine-Pseudoephed (Stahist AD) 25-60 MG tablet; Take 1 tablet by mouth Every 8 (Eight) Hours As Needed (sinus  pain/congestion).  Dispense: 40 tablet; Refill: 0        I spent 30 minutes caring for Jack Pradhan on this date of service. This time includes time spent by me in the following activities: preparing for the visit, reviewing tests, performing a medically appropriate examination and/or evaluation , counseling and educating the patient/family/caregiver, ordering medications, tests, or procedures and documenting information in the medical record        Follow Up     Return in about 3 months (around 3/19/2023), or if symptoms worsen or fail to improve in 3-4 days, for Recheck.  Patient was given instructions and counseling regarding his condition or for health maintenance advice. Please see specific information pulled into the AVS if appropriate.        Part of this note may be an electronic transcription/translation of spoken language to printed text using the Dragon Dictation System   Impaired reasoning

## 2023-08-30 DIAGNOSIS — M10.9 GOUT, UNSPECIFIED CAUSE, UNSPECIFIED CHRONICITY, UNSPECIFIED SITE: ICD-10-CM

## 2023-08-30 DIAGNOSIS — I10 HYPERTENSION, UNSPECIFIED TYPE: Primary | ICD-10-CM

## 2023-09-05 ENCOUNTER — CLINICAL SUPPORT (OUTPATIENT)
Dept: FAMILY MEDICINE CLINIC | Facility: CLINIC | Age: 28
End: 2023-09-05
Payer: MEDICAID

## 2023-09-05 ENCOUNTER — TELEPHONE (OUTPATIENT)
Dept: FAMILY MEDICINE CLINIC | Facility: CLINIC | Age: 28
End: 2023-09-05

## 2023-09-05 DIAGNOSIS — I10 HYPERTENSION, UNSPECIFIED TYPE: Primary | ICD-10-CM

## 2023-09-05 PROCEDURE — 36415 COLL VENOUS BLD VENIPUNCTURE: CPT | Performed by: NURSE PRACTITIONER

## 2023-09-05 PROCEDURE — 80050 GENERAL HEALTH PANEL: CPT | Performed by: NURSE PRACTITIONER

## 2023-09-05 PROCEDURE — 84550 ASSAY OF BLOOD/URIC ACID: CPT | Performed by: NURSE PRACTITIONER

## 2023-09-05 PROCEDURE — 80061 LIPID PANEL: CPT | Performed by: NURSE PRACTITIONER

## 2023-09-05 NOTE — TELEPHONE ENCOUNTER
Jack stated that he is having trouble filling his vyvanse. Spoke with Pharmacist.- Vyvanse has a generic version now. All insurances are not covering brand vyvanse now and requiring it to be generic. The 70mg dosage has not been released with no expected date. [facility] pharmacy currently has 10/40/50/60 mg of generic vyvanse available. Please Advise.

## 2023-09-05 NOTE — PROGRESS NOTES
Venipuncture Blood Specimen Collection  Venipuncture performed in the right arm by Martina Nowak MA with good hemostasis. Patient tolerated the procedure well without complications.   09/05/23   Martina Nowak MA

## 2023-09-06 LAB
ALBUMIN SERPL-MCNC: 4.2 G/DL (ref 3.5–5.2)
ALBUMIN/GLOB SERPL: 1.4 G/DL
ALP SERPL-CCNC: 49 U/L (ref 39–117)
ALT SERPL W P-5'-P-CCNC: 38 U/L (ref 1–41)
ANION GAP SERPL CALCULATED.3IONS-SCNC: 10.2 MMOL/L (ref 5–15)
AST SERPL-CCNC: 21 U/L (ref 1–40)
BILIRUB SERPL-MCNC: 1.2 MG/DL (ref 0–1.2)
BUN SERPL-MCNC: 16 MG/DL (ref 6–20)
BUN/CREAT SERPL: 15.5 (ref 7–25)
CALCIUM SPEC-SCNC: 9.5 MG/DL (ref 8.6–10.5)
CHLORIDE SERPL-SCNC: 102 MMOL/L (ref 98–107)
CHOLEST SERPL-MCNC: 161 MG/DL (ref 0–200)
CO2 SERPL-SCNC: 27.8 MMOL/L (ref 22–29)
CREAT SERPL-MCNC: 1.03 MG/DL (ref 0.76–1.27)
DEPRECATED RDW RBC AUTO: 38.7 FL (ref 37–54)
EGFRCR SERPLBLD CKD-EPI 2021: 101.5 ML/MIN/1.73
ERYTHROCYTE [DISTWIDTH] IN BLOOD BY AUTOMATED COUNT: 12.7 % (ref 12.3–15.4)
GLOBULIN UR ELPH-MCNC: 2.9 GM/DL
GLUCOSE SERPL-MCNC: 99 MG/DL (ref 65–99)
HCT VFR BLD AUTO: 47.7 % (ref 37.5–51)
HDLC SERPL-MCNC: 46 MG/DL (ref 40–60)
HGB BLD-MCNC: 15.6 G/DL (ref 13–17.7)
LDLC SERPL CALC-MCNC: 105 MG/DL (ref 0–100)
LDLC/HDLC SERPL: 2.29 {RATIO}
MCH RBC QN AUTO: 27.5 PG (ref 26.6–33)
MCHC RBC AUTO-ENTMCNC: 32.7 G/DL (ref 31.5–35.7)
MCV RBC AUTO: 84.1 FL (ref 79–97)
PLATELET # BLD AUTO: 276 10*3/MM3 (ref 140–450)
PMV BLD AUTO: 12.1 FL (ref 6–12)
POTASSIUM SERPL-SCNC: 4.4 MMOL/L (ref 3.5–5.2)
PROT SERPL-MCNC: 7.1 G/DL (ref 6–8.5)
RBC # BLD AUTO: 5.67 10*6/MM3 (ref 4.14–5.8)
SODIUM SERPL-SCNC: 140 MMOL/L (ref 136–145)
TRIGL SERPL-MCNC: 48 MG/DL (ref 0–150)
TSH SERPL DL<=0.05 MIU/L-ACNC: 1.46 UIU/ML (ref 0.27–4.2)
URATE SERPL-MCNC: 8 MG/DL (ref 3.4–7)
VLDLC SERPL-MCNC: 10 MG/DL (ref 5–40)
WBC NRBC COR # BLD: 9.52 10*3/MM3 (ref 3.4–10.8)

## 2023-09-13 ENCOUNTER — TELEPHONE (OUTPATIENT)
Dept: FAMILY MEDICINE CLINIC | Facility: CLINIC | Age: 28
End: 2023-09-13

## 2023-09-13 NOTE — TELEPHONE ENCOUNTER
Caller: Jack Pradhan    Relationship to patient: Self    Best call back number: [phone]    Patient is needing: PATIENT CALLED TO CANCEL HIS APPOINTMENT TODAY, 09/13 DUE TO AN EMERGENCY     HUB ADVISED LARISA VILLA'S  NEXT APPOINTMENT WAS 11/01     PATIENT SAID HE WAS NOT COMFORTABLE WAITING THAT LONG BECAUSE HE WILL BE NEEDING HIS ADHD MEDICATION, AND WANTED TO DISCUSS CHANGING IT    REQUESTED CALLBACK

## 2023-09-18 RX ORDER — ALLOPURINOL 100 MG/1
100 TABLET ORAL DAILY
Qty: 90 TABLET | Refills: 0 | Status: SHIPPED | OUTPATIENT
Start: 2023-09-18

## 2023-09-22 NOTE — TELEPHONE ENCOUNTER
Caller: Jack Pradhan    Relationship: Self    Best call back number: [phone]    What medications are you currently taking:   Current Outpatient Medications on File Prior to Visit   Medication Sig Dispense Refill    acetaminophen (TYLENOL) 500 MG tablet Take 1 tablet by mouth Every 6 (Six) Hours As Needed for Mild Pain.      indomethacin SR (INDOCIN SR) 75 MG CR capsule Take 1 capsule by mouth 2 (Two) Times a Day As Needed (joint/back pain). 60 capsule 0    lisdexamfetamine (Vyvanse) 70 MG capsule Take 1 capsule by mouth Every Morning 30 capsule 0    meloxicam (Mobic) 15 MG tablet Take 1 tablet by mouth Daily. 90 tablet 0    vitamin D (ERGOCALCIFEROL) 1.25 MG (50350 UT) capsule capsule Take 1 po weekly on mondays 15 capsule 1     No current facility-administered medications on file prior to visit.            Which medication are you concerned about: lisdexamfetamine (Vyvanse) 70 MG capsule     Who prescribed you this medication: ALLEN    What are your concerns: MICHELLE ADVISED THAT THEY ONLY HAVE GENERIC 60MG.  CAN YOU CALL IN 60MG SO HE CAN TAKE THOSE?  PLEASE CALL TO VERIFY YOU ARE SENDING IT OR NOT.     [id number] - [facility] [place] - [id number]  - [phone] [facility] [phone] FX     How long have you had these concerns: TODAY

## 2023-09-24 DIAGNOSIS — F90.2 ATTENTION DEFICIT HYPERACTIVITY DISORDER (ADHD), COMBINED TYPE: Primary | ICD-10-CM

## 2023-11-01 ENCOUNTER — OFFICE VISIT (OUTPATIENT)
Dept: FAMILY MEDICINE CLINIC | Facility: CLINIC | Age: 28
End: 2023-11-01
Payer: MEDICAID

## 2023-11-01 VITALS
SYSTOLIC BLOOD PRESSURE: 104 MMHG | OXYGEN SATURATION: 98 % | WEIGHT: 263.6 LBS | DIASTOLIC BLOOD PRESSURE: 70 MMHG | BODY MASS INDEX: 34.94 KG/M2 | TEMPERATURE: 98.4 F | HEART RATE: 94 BPM | HEIGHT: 73 IN | RESPIRATION RATE: 18 BRPM

## 2023-11-01 DIAGNOSIS — M10.9 GOUTY ARTHRITIS: ICD-10-CM

## 2023-11-01 DIAGNOSIS — Z00.01 ANNUAL VISIT FOR GENERAL ADULT MEDICAL EXAMINATION WITH ABNORMAL FINDINGS: Primary | ICD-10-CM

## 2023-11-01 DIAGNOSIS — R35.0 URINARY FREQUENCY: ICD-10-CM

## 2023-11-01 DIAGNOSIS — M25.50 MULTIPLE JOINT PAIN: ICD-10-CM

## 2023-11-01 DIAGNOSIS — E55.9 VITAMIN D DEFICIENCY: ICD-10-CM

## 2023-11-01 DIAGNOSIS — F90.2 ATTENTION DEFICIT HYPERACTIVITY DISORDER (ADHD), COMBINED TYPE: ICD-10-CM

## 2023-11-01 DIAGNOSIS — M67.432 GANGLION CYST OF WRIST, LEFT: ICD-10-CM

## 2023-11-01 DIAGNOSIS — Z23 NEED FOR INFLUENZA VACCINATION: ICD-10-CM

## 2023-11-01 LAB
BILIRUB BLD-MCNC: NEGATIVE MG/DL
CLARITY, POC: CLEAR
COLOR UR: YELLOW
GLUCOSE UR STRIP-MCNC: NEGATIVE MG/DL
KETONES UR QL: NEGATIVE
LEUKOCYTE EST, POC: NEGATIVE
NITRITE UR-MCNC: NEGATIVE MG/ML
PH UR: 6 [PH] (ref 5–8)
PROT UR STRIP-MCNC: NEGATIVE MG/DL
RBC # UR STRIP: NEGATIVE /UL
SP GR UR: 1.02 (ref 1–1.03)
UROBILINOGEN UR QL: NORMAL

## 2023-11-01 PROCEDURE — 80048 BASIC METABOLIC PNL TOTAL CA: CPT | Performed by: NURSE PRACTITIONER

## 2023-11-01 PROCEDURE — 86038 ANTINUCLEAR ANTIBODIES: CPT | Performed by: NURSE PRACTITIONER

## 2023-11-01 PROCEDURE — 84550 ASSAY OF BLOOD/URIC ACID: CPT | Performed by: NURSE PRACTITIONER

## 2023-11-01 PROCEDURE — 85652 RBC SED RATE AUTOMATED: CPT | Performed by: NURSE PRACTITIONER

## 2023-11-01 PROCEDURE — 83036 HEMOGLOBIN GLYCOSYLATED A1C: CPT | Performed by: NURSE PRACTITIONER

## 2023-11-01 PROCEDURE — 82306 VITAMIN D 25 HYDROXY: CPT | Performed by: NURSE PRACTITIONER

## 2023-11-01 PROCEDURE — 86140 C-REACTIVE PROTEIN: CPT | Performed by: NURSE PRACTITIONER

## 2023-11-01 PROCEDURE — 86431 RHEUMATOID FACTOR QUANT: CPT | Performed by: NURSE PRACTITIONER

## 2023-11-01 RX ORDER — DEXTROAMPHETAMINE SACCHARATE, AMPHETAMINE ASPARTATE MONOHYDRATE, DEXTROAMPHETAMINE SULFATE AND AMPHETAMINE SULFATE 5; 5; 5; 5 MG/1; MG/1; MG/1; MG/1
20 CAPSULE, EXTENDED RELEASE ORAL EVERY MORNING
Qty: 30 CAPSULE | Refills: 0 | Status: SHIPPED | OUTPATIENT
Start: 2023-11-01

## 2023-11-01 RX ORDER — GABAPENTIN 100 MG/1
100 CAPSULE ORAL 3 TIMES DAILY PRN
Qty: 90 CAPSULE | Refills: 0 | Status: SHIPPED | OUTPATIENT
Start: 2023-11-01

## 2023-11-01 NOTE — PROGRESS NOTES
Venipuncture Blood Specimen Collection  Venipuncture performed in RA by Fadia Vergara MA with good hemostasis. Patient tolerated the procedure well without complications.   11/01/23   Fadia Vergara MA

## 2023-11-01 NOTE — PROGRESS NOTES
Chief Complaint  Chief Complaint   Patient presents with    Annual Exam    ADD     F/up Currently on vyvanse. Not very effective. Would like to change medications.            Subjective          Jack Pradhan presents to [facility] for   History of Present Illness    Patient presents for annual exam    ADD, pt presents today for 3mo f/u, Vyvanse was increased to 70 mg but still minimally effective and it is now on backorder, he was restarted on Vyvanse 60 mg, also not effective, would like to discuss changing medications.  The patient is working, having difficulty completing tasks, reports poor focus, denies chest pain, headache, shortness of air, palpitations and swelling of extremities.     Gouty arthritis, knees, right side of spine starting mid shoulder blades and to the low back, shoulders wrists, hips are still very painful, they pop with most motions. Patient was started on allopurinol, he is no longer taking meloxicam or indomethacin-both were ineffective, reports no improvement in pain. He has cut back on red meat. Pain has been present for years but worsening over the last year.  He reports sitting or laying for >15 min is excruciating. He was in the  and had multiple injuries to joints in past. He does take 4 ES Tylenol daily which somewhat lessens the pain    Complains of a cyst on the left radial wrist, uncomfortable with pressure or when bumping it    He reports frequent urination, urgency, denies dysuria, hematuria      The following portions of the patient's history were reviewed and updated as appropriate: allergies, current medications, past family history, past medical history, past social history, past surgical history and problem list.    Past Medical History:   Diagnosis Date    ADHD (attention deficit hyperactivity disorder)     Eczema     Headache      History reviewed. No pertinent surgical history.  Family History   Problem Relation Age of Onset     "Hypertension Mother     Hyperlipidemia Mother     Diabetes Mother      Social History     Tobacco Use    Smoking status: Never    Smokeless tobacco: Never   Substance Use Topics    Alcohol use: Yes     Comment: OCC       Current Outpatient Medications:     acetaminophen (TYLENOL) 500 MG tablet, Take 1 tablet by mouth Every 6 (Six) Hours As Needed for Mild Pain., Disp: , Rfl:     allopurinol (Zyloprim) 100 MG tablet, Take 1 tablet by mouth Daily., Disp: 90 tablet, Rfl: 0    amphetamine-dextroamphetamine XR (Adderall XR) 20 MG 24 hr capsule, Take 1 capsule by mouth Every Morning, Disp: 30 capsule, Rfl: 0    gabapentin (NEURONTIN) 100 MG capsule, Take 1 capsule by mouth 3 (Three) Times a Day As Needed (chronic pain)., Disp: 90 capsule, Rfl: 0    Objective   Vital Signs:   /70 (BP Location: Left arm, Patient Position: Sitting, Cuff Size: Large Adult)   Pulse 94   Temp 98.4 °F (36.9 °C) (Oral)   Resp 18   Ht 185.4 cm (73\")   Wt 120 kg (263 lb 9.6 oz)   SpO2 98% Comment: Room air  BMI 34.78 kg/m²           Physical Exam  Constitutional:       General: He is not in acute distress.     Appearance: Normal appearance. He is well-developed. He is not ill-appearing or diaphoretic.   HENT:      Head: Normocephalic.   Eyes:      Conjunctiva/sclera: Conjunctivae normal.      Pupils: Pupils are equal, round, and reactive to light.   Neck:      Thyroid: No thyromegaly.      Vascular: No JVD.   Cardiovascular:      Rate and Rhythm: Normal rate and regular rhythm.      Heart sounds: Normal heart sounds. No murmur heard.  Pulmonary:      Effort: Pulmonary effort is normal. No respiratory distress.      Breath sounds: Normal breath sounds. No wheezing or rhonchi.   Abdominal:      General: Bowel sounds are normal. There is no distension.      Palpations: Abdomen is soft.      Tenderness: There is no abdominal tenderness.   Musculoskeletal:         General: Tenderness (multi-joint pain: B knees, hips, wrists, shoulders, " right paraspinal thoracic to lumbar region. all ttp, no swelling or erythema, mild angeles rom) present. No swelling. Normal range of motion.      Cervical back: Normal range of motion and neck supple. No tenderness.   Lymphadenopathy:      Cervical: No cervical adenopathy.   Skin:     General: Skin is warm and dry.      Coloration: Skin is not jaundiced.      Findings: No erythema or rash.   Neurological:      General: No focal deficit present.      Mental Status: He is alert and oriented to person, place, and time. Mental status is at baseline.      Sensory: No sensory deficit.      Motor: No weakness.      Gait: Gait normal.   Psychiatric:         Mood and Affect: Mood normal.         Behavior: Behavior normal.         Thought Content: Thought content normal.         Judgment: Judgment normal.          Result Review :     Office Visit on 11/01/2023   Component Date Value Ref Range Status    Color 11/01/2023 Yellow  Yellow, Straw, Dark Yellow, Zahra Final    Clarity, UA 11/01/2023 Clear  Clear Final    Glucose, UA 11/01/2023 Negative  Negative mg/dL Final    Bilirubin 11/01/2023 Negative  Negative Final    Ketones, UA 11/01/2023 Negative  Negative Final    Specific Gravity  11/01/2023 1.025  1.005 - 1.030 Final    Blood, UA 11/01/2023 Negative  Negative Final    pH, Urine 11/01/2023 6.0  5.0 - 8.0 Final    Protein, POC 11/01/2023 Negative  Negative mg/dL Final    Urobilinogen, UA 11/01/2023 Normal  Normal, 0.2 E.U./dL Final    Leukocytes 11/01/2023 Negative  Negative Final    Nitrite, UA 11/01/2023 Negative  Negative Final                              Assessment and Plan    Diagnoses and all orders for this visit:    1. Annual visit for general adult medical examination with abnormal findings (Primary)  Comments:  Labs today as ordered including autoimmune work-up  Flu shot today  Orders:  -     Rheumatoid Factor  -     Uric Acid  -     RODO  -     Sedimentation Rate  -     C-reactive Protein  -     Vitamin  D,25-Hydroxy  -     Basic Metabolic Panel  -     POC Urinalysis Dipstick  -     Hemoglobin A1c    2. Need for influenza vaccination  -     Fluzone (or Fluarix & Flulaval for VFC) >6 Mos (5525-6702)    3. Multiple joint pain  Comments:  Autoimmune work-up  Recommend trial of gabapentin  D/C meloxicam and indomethacin, both ineffective  Consider referral to rheumatology  Orders:  -     Rheumatoid Factor  -     Uric Acid  -     RODO  -     Sedimentation Rate  -     C-reactive Protein  -     Vitamin D,25-Hydroxy  -     Basic Metabolic Panel  -     POC Urinalysis Dipstick  -     Hemoglobin A1c    4. Ganglion cyst of wrist, left  Comments:  Referral to  for eval  Orders:  -     Ambulatory Referral to Hand Surgery    5. Attention deficit hyperactivity disorder (ADHD), combined type  Comments:  DC Vyvanse, ineffective  Recommend trial of Adderall XR  Patient to call or message in 2 weeks with update of effectiveness  Orders:  -     amphetamine-dextroamphetamine XR (Adderall XR) 20 MG 24 hr capsule; Take 1 capsule by mouth Every Morning  Dispense: 30 capsule; Refill: 0    6. Urinary frequency  Comments:  urine dip negative today   check hemoglobin A1c  Orders:  -     POC Urinalysis Dipstick  -     Hemoglobin A1c    7. Vitamin D deficiency  Comments:  Recheck vitamin D today  Resume weekly vitamin D if needed  Orders:  -     Vitamin D,25-Hydroxy    8. Gouty arthritis  Comments:  Uric acid today  Continue allopurinol, titrate if uric acid still elevated    Other orders  -     gabapentin (NEURONTIN) 100 MG capsule; Take 1 capsule by mouth 3 (Three) Times a Day As Needed (chronic pain).  Dispense: 90 capsule; Refill: 0    Age appropriate preventative counseling provided, including healthy lifestyle modifications and exercise      I spent 30 minutes caring for Jack Pradhan on this date of service. This time includes time spent by me in the following activities: preparing for the visit, reviewing tests, performing a medically  appropriate examination and/or evaluation , counseling and educating the patient/family/caregiver, ordering medications, tests, or procedures and documenting information in the medical record        Follow Up     Return in about 3 months (around 2/1/2024) for Recheck ADD pain.  Patient was given instructions and counseling regarding his condition or for health maintenance advice. Please see specific information pulled into the AVS if appropriate.        Part of this note may be an electronic transcription/translation of spoken language to printed text using the Dragon Dictation System

## 2023-11-02 LAB
25(OH)D3 SERPL-MCNC: 27.2 NG/ML (ref 30–100)
ANION GAP SERPL CALCULATED.3IONS-SCNC: 10 MMOL/L (ref 5–15)
BUN SERPL-MCNC: 19 MG/DL (ref 6–20)
BUN/CREAT SERPL: 18.4 (ref 7–25)
CALCIUM SPEC-SCNC: 9.7 MG/DL (ref 8.6–10.5)
CHLORIDE SERPL-SCNC: 106 MMOL/L (ref 98–107)
CHROMATIN AB SERPL-ACNC: 11 IU/ML (ref 0–14)
CO2 SERPL-SCNC: 26 MMOL/L (ref 22–29)
CREAT SERPL-MCNC: 1.03 MG/DL (ref 0.76–1.27)
CRP SERPL-MCNC: <0.3 MG/DL (ref 0–0.5)
EGFRCR SERPLBLD CKD-EPI 2021: 101.5 ML/MIN/1.73
ERYTHROCYTE [SEDIMENTATION RATE] IN BLOOD: 6 MM/HR (ref 0–15)
GLUCOSE SERPL-MCNC: 105 MG/DL (ref 65–99)
HBA1C MFR BLD: 5.4 % (ref 4.8–5.6)
POTASSIUM SERPL-SCNC: 4.4 MMOL/L (ref 3.5–5.2)
SODIUM SERPL-SCNC: 142 MMOL/L (ref 136–145)
URATE SERPL-MCNC: 8.4 MG/DL (ref 3.4–7)

## 2023-11-03 LAB — ANA SER QL: NEGATIVE

## 2023-11-06 DIAGNOSIS — E55.9 VITAMIN D DEFICIENCY: ICD-10-CM

## 2023-11-06 RX ORDER — ERGOCALCIFEROL 1.25 MG/1
CAPSULE ORAL
Qty: 12 CAPSULE | Refills: 1 | Status: SHIPPED | OUTPATIENT
Start: 2023-11-06

## 2023-11-06 RX ORDER — ALLOPURINOL 300 MG/1
300 TABLET ORAL DAILY
Qty: 90 TABLET | Refills: 1 | Status: SHIPPED | OUTPATIENT
Start: 2023-11-06

## 2023-12-08 DIAGNOSIS — F90.2 ATTENTION DEFICIT HYPERACTIVITY DISORDER (ADHD), COMBINED TYPE: ICD-10-CM

## 2023-12-08 NOTE — TELEPHONE ENCOUNTER
Caller: Jack Pradhan    Relationship: Self    Best call back number: [phone]    Requested Prescriptions:   Requested Prescriptions     Pending Prescriptions Disp Refills    amphetamine-dextroamphetamine XR (Adderall XR) 20 MG 24 hr capsule 30 capsule 0     Sig: Take 1 capsule by mouth Every Morning        Pharmacy where request should be sent: [facility] [id number] [place] [address] [facility] - [phone]  - [phone] FX     Last office visit with prescribing clinician: 11/1/2023   Last telemedicine visit with prescribing clinician: Visit date not found   Next office visit with prescribing clinician: 2/5/2024     Additional details provided by patient: NO    Does the patient have less than a 3 day supply:  [x] Yes  [] No    Would you like a call back once the refill request has been completed: [x] Yes [] No    If the office needs to give you a call back, can they leave a voicemail: [x] Yes [] No    Lauren Oro Rep   12/08/23 14:17 EST

## 2023-12-11 RX ORDER — DEXTROAMPHETAMINE SACCHARATE, AMPHETAMINE ASPARTATE MONOHYDRATE, DEXTROAMPHETAMINE SULFATE AND AMPHETAMINE SULFATE 5; 5; 5; 5 MG/1; MG/1; MG/1; MG/1
20 CAPSULE, EXTENDED RELEASE ORAL EVERY MORNING
Qty: 30 CAPSULE | Refills: 0 | Status: SHIPPED | OUTPATIENT
Start: 2023-12-11

## 2024-01-12 ENCOUNTER — TELEPHONE (OUTPATIENT)
Dept: FAMILY MEDICINE CLINIC | Facility: CLINIC | Age: 29
End: 2024-01-12
Payer: MEDICAID

## 2024-01-12 RX ORDER — GABAPENTIN 100 MG/1
100 CAPSULE ORAL 3 TIMES DAILY PRN
Qty: 90 CAPSULE | Refills: 0 | Status: SHIPPED | OUTPATIENT
Start: 2024-01-12

## 2024-01-12 NOTE — TELEPHONE ENCOUNTER
Caller: Jack Pradhan    Relationship: Self    Best call back number: [phone]    Requested Prescriptions:   Requested Prescriptions     Pending Prescriptions Disp Refills    gabapentin (NEURONTIN) 100 MG capsule 90 capsule 0     Sig: Take 1 capsule by mouth 3 (Three) Times a Day As Needed (chronic pain).        Pharmacy where request should be sent: [facility] [id number] [facility] [address] [facility] - [phone]  - [phone] FX     Last office visit with prescribing clinician: 11/1/2023   Last telemedicine visit with prescribing clinician: Visit date not found   Next office visit with prescribing clinician: 2/5/2024     Additional details provided by patient: PATIENT IS COMPLETELY OUT     Does the patient have less than a 3 day supply:  [x] Yes  [] No    Would you like a call back once the refill request has been completed: [x] Yes [] No    If the office needs to give you a call back, can they leave a voicemail: [x] Yes [] No    JAZMIN Mattson   01/12/24 11:27 EST

## 2024-01-12 NOTE — TELEPHONE ENCOUNTER
Caller: Jack Pradhan    Relationship: Self    Best call back number: [phone]    PATIENT WANTS TO TRY AND INCREASE THE ADDERALL XR DOSAGE.    HE TAKES A 20 MG CURRENTLY.    YESTERDAY HE TOOK DOUBLE, ON ACCIDENT, MAKING IT 40 MG AND HE HARDLY FELT THAT AS IT IS.  SCALE OF 1-10 OF THIS MEDICATION WORKING FOR HIM, HE'S AT A 4.9 AND THAT IS WHILE TAKING DOUBLE.      IN COMPARISON TO VYVANSE, WHICH HE HAS TAKEN, THE ADDERALL DOES NOT STOP HIS MIGRAINES, WHERE VYVANSE TOOK THE MIGRAINES AWAY, MOST OF THE TIME.    HE WOULD LIKE TO INCREASE THE ADDERALL, BEFORE MAKING ANY MEDICATION CHANGES TO A DIFFERENT PRESCRIPTION/ GOING BACK TO AN OLD ONE (VYVANSE)     PLEASE GIVE PATIENT A CALLBACK

## 2024-01-13 DIAGNOSIS — F90.2 ATTENTION DEFICIT HYPERACTIVITY DISORDER (ADHD), COMBINED TYPE: Primary | ICD-10-CM

## 2024-01-13 RX ORDER — DEXTROAMPHETAMINE SACCHARATE, AMPHETAMINE ASPARTATE MONOHYDRATE, DEXTROAMPHETAMINE SULFATE AND AMPHETAMINE SULFATE 7.5; 7.5; 7.5; 7.5 MG/1; MG/1; MG/1; MG/1
30 CAPSULE, EXTENDED RELEASE ORAL EVERY MORNING
Qty: 30 CAPSULE | Refills: 0 | Status: SHIPPED | OUTPATIENT
Start: 2024-01-13

## 2024-02-05 ENCOUNTER — TELEMEDICINE (OUTPATIENT)
Dept: FAMILY MEDICINE CLINIC | Facility: CLINIC | Age: 29
End: 2024-02-05
Payer: MEDICAID

## 2024-02-05 DIAGNOSIS — M67.432 GANGLION CYST OF WRIST, LEFT: ICD-10-CM

## 2024-02-05 DIAGNOSIS — M10.9 GOUTY ARTHRITIS: ICD-10-CM

## 2024-02-05 DIAGNOSIS — F90.2 ATTENTION DEFICIT HYPERACTIVITY DISORDER (ADHD), COMBINED TYPE: Primary | ICD-10-CM

## 2024-02-05 DIAGNOSIS — G43.709 CHRONIC MIGRAINE WITHOUT AURA WITHOUT STATUS MIGRAINOSUS, NOT INTRACTABLE: ICD-10-CM

## 2024-02-05 PROCEDURE — 99214 OFFICE O/P EST MOD 30 MIN: CPT | Performed by: NURSE PRACTITIONER

## 2024-02-05 RX ORDER — DEXTROAMPHETAMINE SACCHARATE, AMPHETAMINE ASPARTATE, DEXTROAMPHETAMINE SULFATE AND AMPHETAMINE SULFATE 2.5; 2.5; 2.5; 2.5 MG/1; MG/1; MG/1; MG/1
10 TABLET ORAL
Qty: 14 TABLET | Refills: 0 | Status: SHIPPED | OUTPATIENT
Start: 2024-02-05

## 2024-02-05 NOTE — PROGRESS NOTES
Chief Complaint   Patient presents with    ADD     Follow up.  Adderall was increased to 30mg with no relief. Struggling to focus and keep thoughts together. Vyvanse helped but unavailable due to shortage.    Migraine     Occurring daily. Vyvanse helped but unavailable due to shortage.       You have chosen to receive care through a telemedicine visit. Do you consent to use a telemedicine visit for your medical care today? Yes. The patient was located in his home, the provider located at her home.       HPI    ADD/migraines, pt presents today for 3mo f/u, pt was changed to adderall xr instead of vyvanse due to the 60mg not being available. Vyvanse was effective at alleviating migraines as well. Adderall xr 20mg not effective, titrated to 30mg still not feeling like he is focusing well and migraines are not controlled. The patient denies chest pain, headache, shortness of air, palpitations and swelling of extremities, wants to discuss med adjustment or changes.      Cyst of wrist, hasn't seen KK yet     Gout significantly improved on allopurinol.           The following portions of the patient's history were reviewed and updated as appropriate: allergies, current medications, past family history, past medical history, past social history, past surgical history and problem list.    Past Medical History:   Diagnosis Date    ADHD (attention deficit hyperactivity disorder)     Eczema     Headache      No past surgical history on file.  Family History   Problem Relation Age of Onset    Hypertension Mother     Hyperlipidemia Mother     Diabetes Mother      Social History     Tobacco Use    Smoking status: Never    Smokeless tobacco: Never   Substance Use Topics    Alcohol use: Yes     Comment: OCC         Current Outpatient Medications:     acetaminophen (TYLENOL) 500 MG tablet, Take 1 tablet by mouth Every 6 (Six) Hours As Needed for Mild Pain., Disp: , Rfl:     allopurinol (Zyloprim) 300 MG tablet, Take 1 tablet by mouth  Daily., Disp: 90 tablet, Rfl: 1    amphetamine-dextroamphetamine XR (Adderall XR) 30 MG 24 hr capsule, Take 1 capsule by mouth Every Morning, Disp: 30 capsule, Rfl: 0    gabapentin (NEURONTIN) 100 MG capsule, Take 1 capsule by mouth 3 (Three) Times a Day As Needed (chronic pain)., Disp: 90 capsule, Rfl: 0    vitamin D (ERGOCALCIFEROL) 1.25 MG (05865 UT) capsule capsule, Take 1 po weekly on mondays, Disp: 12 capsule, Rfl: 1    amphetamine-dextroamphetamine (Adderall) 10 MG tablet, Take 1 tablet by mouth Daily With Lunch. Continue adderall XR 30 mg in am, Disp: 14 tablet, Rfl: 0        There were no vitals filed for this visit.  There is no height or weight on file to calculate BMI.    Physical Exam  Constitutional:       General: He is not in acute distress.     Appearance: Normal appearance. He is not ill-appearing.      Comments: Exam otherwise deferred through video/audio visit   Pulmonary:      Effort: Pulmonary effort is normal.   Neurological:      Mental Status: He is alert and oriented to person, place, and time.   Psychiatric:         Behavior: Behavior normal.         Thought Content: Thought content normal.         Judgment: Judgment normal.         No visits with results within 7 Day(s) from this visit.   Latest known visit with results is:   Office Visit on 11/01/2023   Component Date Value Ref Range Status    Rheumatoid Factor Quantitative 11/01/2023 11.0  0.0 - 14.0 IU/mL Final    Uric Acid 11/01/2023 8.4 (H)  3.4 - 7.0 mg/dL Final    Antinuclear Antibodies (RODO) 11/01/2023 Negative  Negative Final    Sed Rate 11/01/2023 6  0 - 15 mm/hr Final    C-Reactive Protein 11/01/2023 <0.30  0.00 - 0.50 mg/dL Final    25 Hydroxy, Vitamin D 11/01/2023 27.2 (L)  30.0 - 100.0 ng/ml Final    Glucose 11/01/2023 105 (H)  65 - 99 mg/dL Final    BUN 11/01/2023 19  6 - 20 mg/dL Final    Creatinine 11/01/2023 1.03  0.76 - 1.27 mg/dL Final    Sodium 11/01/2023 142  136 - 145 mmol/L Final    Potassium 11/01/2023 4.4  3.5 -  5.2 mmol/L Final    Chloride 11/01/2023 106  98 - 107 mmol/L Final    CO2 11/01/2023 26.0  22.0 - 29.0 mmol/L Final    Calcium 11/01/2023 9.7  8.6 - 10.5 mg/dL Final    BUN/Creatinine Ratio 11/01/2023 18.4  7.0 - 25.0 Final    Anion Gap 11/01/2023 10.0  5.0 - 15.0 mmol/L Final    eGFR 11/01/2023 101.5  >60.0 mL/min/1.73 Final    Color 11/01/2023 Yellow  Yellow, Straw, Dark Yellow, Zahra Final    Clarity, UA 11/01/2023 Clear  Clear Final    Glucose, UA 11/01/2023 Negative  Negative mg/dL Final    Bilirubin 11/01/2023 Negative  Negative Final    Ketones, UA 11/01/2023 Negative  Negative Final    Specific Gravity  11/01/2023 1.025  1.005 - 1.030 Final    Blood, UA 11/01/2023 Negative  Negative Final    pH, Urine 11/01/2023 6.0  5.0 - 8.0 Final    Protein, POC 11/01/2023 Negative  Negative mg/dL Final    Urobilinogen, UA 11/01/2023 Normal  Normal, 0.2 E.U./dL Final    Leukocytes 11/01/2023 Negative  Negative Final    Nitrite, UA 11/01/2023 Negative  Negative Final    Hemoglobin A1C 11/01/2023 5.40  4.80 - 5.60 % Final       Diagnoses and all orders for this visit:    1. Attention deficit hyperactivity disorder (ADHD), combined type (Primary)  Comments:  continue adderall xr in am, will add adderall 10mg at noon.  Orders:  -     amphetamine-dextroamphetamine (Adderall) 10 MG tablet; Take 1 tablet by mouth Daily With Lunch. Continue adderall XR 30 mg in am  Dispense: 14 tablet; Refill: 0    2. Chronic migraine without aura without status migrainosus, not intractable  Comments:  not being controlled with adderall. will add noon dose of adderall 10mg IR  consider resuming vyvanse 60mgm(two 30mg caps)    3. Ganglion cyst of wrist, left  Comments:  schedule appt with KK when able    4. Gouty arthritis  Comments:  improved, cont allopurinol      Return in about 3 months (around 5/5/2024) for Recheck ADD .      This was an audio and video enabled telemedicine encounter.  Total time of discussion was 25 minutes     EMR Chris  transcription disclaimer:  Part of this note may be an electronic transcription/translation of spoken language to printed text using the Dragon Dictation System.

## 2024-03-28 ENCOUNTER — TELEPHONE (OUTPATIENT)
Dept: FAMILY MEDICINE CLINIC | Facility: CLINIC | Age: 29
End: 2024-03-28

## 2024-03-28 NOTE — TELEPHONE ENCOUNTER
.    Caller: Jack Pradhan    Relationship: Self    Best call back number: [phone]     What medication are you requesting: VYVANSE 60 MG     If a prescription is needed, what is your preferred pharmacy and phone number: [facility] [id number] - [place] [id number] - [phone]  - [phone]      Additional notes: STATES DOES NOT LIKE BEING ON THE ADDERALL AND WANTS TO BE SWITCHED BACK TO VYVANSE

## 2024-03-29 DIAGNOSIS — F90.2 ATTENTION DEFICIT HYPERACTIVITY DISORDER (ADHD), COMBINED TYPE: Primary | ICD-10-CM

## 2024-03-29 RX ORDER — LISDEXAMFETAMINE DIMESYLATE CAPSULES 30 MG/1
60 CAPSULE ORAL EVERY MORNING
Qty: 60 CAPSULE | Refills: 0 | Status: SHIPPED | OUTPATIENT
Start: 2024-03-29

## 2024-04-02 NOTE — TELEPHONE ENCOUNTER
Caller: Jack Pradhan    Relationship to patient: Self    Best call back number: [phone]    Patient is needing:     PATIENT FOUND OUT TODAY THAT HIS INSURANCE IS REQUIRING A PRIOR AUTHORIZATION FOR HIM TO BE ABLE TO TAKE THE     lisdexamfetamine (Vyvanse) 30 MG capsule     SPECIFICALLY FOR HIM TO BE ABLE TO TAKE TWO TABLETS.   THEY WILL COVER AT ONE TABLET DAILY BUT ARE WANTING A PA FOR TWO TABLETS DAILY.     PATIENT REQUESTING THAT A PA BE STARTED FOR HIM.

## 2024-04-03 ENCOUNTER — TELEPHONE (OUTPATIENT)
Dept: FAMILY MEDICINE CLINIC | Facility: CLINIC | Age: 29
End: 2024-04-03
Payer: MEDICAID

## 2024-04-12 ENCOUNTER — TELEPHONE (OUTPATIENT)
Dept: FAMILY MEDICINE CLINIC | Facility: CLINIC | Age: 29
End: 2024-04-12
Payer: MEDICAID

## 2024-04-12 NOTE — TELEPHONE ENCOUNTER
Caller: Jack Pradhan    Relationship: Self    Best call back number: [phone]    PATIENT IS REQUESTING A CALLBACK REGARDING HIS VYVANSE MEDICATION.    HE HAS BEEN TRYING TO GET THIS MEDICATION FOR A MONTH NOW.    LAST HE HEARD WE WERE STARTING THE PRIOR AUTHORIZATION PROCESS.        lisdexamfetamine (Vyvanse) 30 MG capsule     PATIENT IS WORKING ON GETTING THIS INCREASED TO 60 MG.     PATIENT REQUESTING A CALLBACK BEFORE END OF DAY TODAY.      PLEASE

## 2024-04-12 NOTE — TELEPHONE ENCOUNTER
Spoke with patient. We have tried submitting PA for patient and the insurance information we have states that he is not active. Contacted the pharmacy and they have the same information. Advised pt to contact his insurance company to make sure his plan is still active and to be sure they have all of his information correct in the system. Advised to let us know what he finds out and we can resubmit PA with updated information. He expressed understanding.